# Patient Record
Sex: MALE | URBAN - METROPOLITAN AREA
[De-identification: names, ages, dates, MRNs, and addresses within clinical notes are randomized per-mention and may not be internally consistent; named-entity substitution may affect disease eponyms.]

---

## 2018-10-19 ENCOUNTER — TELEPHONE (OUTPATIENT)
Dept: NEUROLOGY | Facility: CLINIC | Age: 63
End: 2018-10-19

## 2018-10-19 NOTE — TELEPHONE ENCOUNTER
LVM: called to scheduled appt with Neuromuscular Specialist. Received referral with clinic notes and EMG from Dr. Turner (Pulmonologist w/ [facility] Tel: [phone] ext [phone] Geoffrey & Fax: [phone]).

## 2018-10-30 ENCOUNTER — TELEPHONE (OUTPATIENT)
Dept: NEUROLOGY | Facility: CLINIC | Age: 63
End: 2018-10-30

## 2018-10-30 NOTE — TELEPHONE ENCOUNTER
Contacted Kasia (Tel: [phone] ext [phone]) to inform referral packet from Dr. Turner received and left VM for patient's wife, Mrs. Dove. Kasia stated that she will contact patient return call regarding appt (Query ALS).    RN Coordinator inquired of patient's status. Kasia indicated that patient is weak and anxious to obtain appt for evaluation by Neuromuscular Specialist.     EMG completed on 9/26/18 by Dr. Damion Dior. PFTs study done on 10/8/18. Results included in referral packet.

## 2018-10-30 NOTE — TELEPHONE ENCOUNTER
Orders, demographics and notes faxed to Dr Jb Fragoso's office. Instructed to contact our office as soon as appt is scheduled so we may schedule follow up with Dr Nick.

## 2018-10-30 NOTE — TELEPHONE ENCOUNTER
Sent referral packet to Dr. Barrett's office via email to MONICA Elizabeth. Clara will follow-up regarding EMG procedure appt availability on [facility].     Dr. Claros and Dr. Patel reviewed EMG and noted needle portion of exam was not included or completed.

## 2018-10-30 NOTE — TELEPHONE ENCOUNTER
----- Message from Aleksandr Guadalupe sent at 10/30/2018  9:21 AM CDT -----  Needs Advice    Reason for call: Kasia is calling to schedule an appt w/ the neuromuscular specialist        Communication Preference: Kasia muse/ Dr. Turner office @ [phone]    Additional Information:

## 2018-11-20 ENCOUNTER — TELEPHONE (OUTPATIENT)
Dept: NEUROLOGY | Facility: CLINIC | Age: 63
End: 2018-11-20

## 2018-11-20 RX ORDER — GABAPENTIN 100 MG/1
CAPSULE ORAL
COMMUNITY
Start: 2018-11-07

## 2018-11-20 RX ORDER — ROPINIROLE 0.5 MG/1
0.5 TABLET, FILM COATED ORAL
COMMUNITY
Start: 2018-11-07

## 2018-11-20 RX ORDER — ZOLPIDEM TARTRATE 5 MG/1
5 TABLET ORAL
COMMUNITY

## 2018-11-20 RX ORDER — PANTOPRAZOLE SODIUM 40 MG/1
40 TABLET, DELAYED RELEASE ORAL
COMMUNITY

## 2018-11-20 RX ORDER — FUROSEMIDE 40 MG/1
40 TABLET ORAL
COMMUNITY
Start: 2018-03-06

## 2018-11-20 RX ORDER — ZOLPIDEM TARTRATE 10 MG/1
10 TABLET ORAL
COMMUNITY
Start: 2018-09-13

## 2018-11-20 RX ORDER — ASPIRIN 325 MG
325 TABLET, DELAYED RELEASE (ENTERIC COATED) ORAL
COMMUNITY
Start: 2018-03-06

## 2018-11-20 RX ORDER — ALBUTEROL SULFATE 90 UG/1
AEROSOL, METERED RESPIRATORY (INHALATION)
COMMUNITY
Start: 2018-08-29

## 2018-11-20 RX ORDER — DOXYCYCLINE 100 MG/1
100 CAPSULE ORAL
COMMUNITY
Start: 2018-08-29

## 2018-11-20 RX ORDER — ATORVASTATIN CALCIUM 40 MG/1
40 TABLET, FILM COATED ORAL
COMMUNITY
Start: 2018-10-26

## 2018-11-20 RX ORDER — POTASSIUM CHLORIDE 750 MG/1
10 TABLET, EXTENDED RELEASE ORAL
COMMUNITY
Start: 2018-03-06

## 2018-11-20 RX ORDER — DOCUSATE SODIUM 100 MG/1
100 CAPSULE, LIQUID FILLED ORAL
COMMUNITY
Start: 2018-03-06

## 2018-11-20 RX ORDER — METOPROLOL SUCCINATE 25 MG/1
25 TABLET, EXTENDED RELEASE ORAL
COMMUNITY
Start: 2018-10-26

## 2018-11-20 RX ORDER — OXYCODONE AND ACETAMINOPHEN 5; 325 MG/1; MG/1
1 TABLET ORAL
COMMUNITY
Start: 2018-03-06

## 2018-11-20 RX ORDER — ATORVASTATIN CALCIUM 40 MG/1
40 TABLET, FILM COATED ORAL
COMMUNITY
Start: 2018-03-06

## 2018-11-20 NOTE — TELEPHONE ENCOUNTER
RN Coordinator left message to discuss rescheduling appt that was missed this morning, 11/20/18, with Dr. Nick.